# Patient Record
Sex: MALE | Race: WHITE | NOT HISPANIC OR LATINO | Employment: FULL TIME | URBAN - METROPOLITAN AREA
[De-identification: names, ages, dates, MRNs, and addresses within clinical notes are randomized per-mention and may not be internally consistent; named-entity substitution may affect disease eponyms.]

---

## 2018-11-19 ENCOUNTER — TELEPHONE (OUTPATIENT)
Dept: UROLOGY | Facility: CLINIC | Age: 48
End: 2018-11-19

## 2018-11-19 NOTE — TELEPHONE ENCOUNTER
Spoke to pt and booked him an apt for the 3rd. Pt has some redness at the tip of his penis. No pain, able to uriante without difficulty, no swelling. This happen Saturday afternoon.

## 2018-11-19 NOTE — TELEPHONE ENCOUNTER
----- Message from Jameel Andrade sent at 11/19/2018 10:45 AM CST -----  Type:  Sooner Apoointment Request    Caller is requesting a sooner appointment.  Caller declined first available appointment listed below.  Caller will not accept being placed on the waitlist and is requesting a message be sent to doctor.    Name of Caller:  Patient  When is the first available appointment?  12/03  Symptoms:  Redness, irritation on tip of penis  Best Call Back Number:  [phone]  Additional Information:  New Patient

## 2018-12-31 ENCOUNTER — LAB VISIT (OUTPATIENT)
Dept: LAB | Facility: HOSPITAL | Age: 48
End: 2018-12-31
Attending: UROLOGY
Payer: COMMERCIAL

## 2018-12-31 DIAGNOSIS — N41.1 PROSTATITIS, CHRONIC: Primary | ICD-10-CM

## 2018-12-31 PROCEDURE — 87070 CULTURE OTHR SPECIMN AEROBIC: CPT

## 2019-01-02 LAB — BACTERIA SPEC AEROBE CULT: NORMAL

## 2024-05-07 ENCOUNTER — NURSE TRIAGE (OUTPATIENT)
Dept: ADMINISTRATIVE | Facility: CLINIC | Age: 54
End: 2024-05-07
Payer: COMMERCIAL